# Patient Record
Sex: FEMALE | URBAN - METROPOLITAN AREA
[De-identification: names, ages, dates, MRNs, and addresses within clinical notes are randomized per-mention and may not be internally consistent; named-entity substitution may affect disease eponyms.]

---

## 2020-09-11 ENCOUNTER — OFFICE VISIT (OUTPATIENT)
Dept: URBAN - METROPOLITAN AREA CLINIC 88 | Facility: CLINIC | Age: 60
End: 2020-09-11
Payer: MEDICARE

## 2020-09-11 DIAGNOSIS — H53.8 OTHER VISUAL DISTURBANCES: Primary | ICD-10-CM

## 2020-09-11 DIAGNOSIS — H25.13 AGE-RELATED NUCLEAR CATARACT, BILATERAL: ICD-10-CM

## 2020-09-11 DIAGNOSIS — D23.39 OTHER BENIGN NEOPLASM OF SKIN OF OTHER PART OF FACE: ICD-10-CM

## 2020-09-11 PROCEDURE — 99203 OFFICE O/P NEW LOW 30 MIN: CPT | Performed by: OPTOMETRIST

## 2020-09-11 NOTE — IMPRESSION/PLAN
Impression: Other visual disturbances: H53.8. Plan: Discussed status in detail. Recommend VF 30-2, color vision test and OCT ON. Recommend cardiovascular evaluation with PCP. Reviewed risk associated with not dilating pupils. Patient understands risk and declines diagnostic agents.  Patient knows to RTC immediately if flashes, floaters or changes in vision are experienced

## 2020-09-11 NOTE — IMPRESSION/PLAN
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 Plan: Longstanding per patient, monitor.